# Patient Record
Sex: MALE | Race: WHITE | NOT HISPANIC OR LATINO | Employment: FULL TIME | URBAN - NONMETROPOLITAN AREA
[De-identification: names, ages, dates, MRNs, and addresses within clinical notes are randomized per-mention and may not be internally consistent; named-entity substitution may affect disease eponyms.]

---

## 2019-09-08 ENCOUNTER — HOSPITAL ENCOUNTER (OUTPATIENT)
Facility: HOSPITAL | Age: 44
Setting detail: OBSERVATION
Discharge: HOME | End: 2019-09-09
Attending: FAMILY MEDICINE | Admitting: FAMILY MEDICINE

## 2019-09-08 ENCOUNTER — APPOINTMENT (OUTPATIENT)
Dept: GENERAL RADIOLOGY | Facility: HOSPITAL | Age: 44
End: 2019-09-08

## 2019-09-08 DIAGNOSIS — I20.0 UNSTABLE ANGINA (HCC): Primary | ICD-10-CM

## 2019-09-08 PROBLEM — R07.9 CHEST PAIN: Status: ACTIVE | Noted: 2019-09-08

## 2019-09-08 LAB
A-A DO2: 18.6 MMHG (ref 0–300)
ALBUMIN SERPL-MCNC: 4.82 G/DL (ref 3.5–5.2)
ALBUMIN/GLOB SERPL: 1.7 G/DL
ALP SERPL-CCNC: 71 U/L (ref 39–117)
ALT SERPL W P-5'-P-CCNC: 61 U/L (ref 1–41)
ANION GAP SERPL CALCULATED.3IONS-SCNC: 15.1 MMOL/L (ref 5–15)
ARTERIAL PATENCY WRIST A: ABNORMAL
AST SERPL-CCNC: 27 U/L (ref 1–40)
ATMOSPHERIC PRESS: 729 MMHG
BASE EXCESS BLDA CALC-SCNC: -1.6 MMOL/L
BASOPHILS # BLD AUTO: 0.02 10*3/MM3 (ref 0–0.2)
BASOPHILS NFR BLD AUTO: 0.2 % (ref 0–1.5)
BDY SITE: ABNORMAL
BILIRUB SERPL-MCNC: 0.2 MG/DL (ref 0.2–1.2)
BODY TEMPERATURE: 98.6 C
BUN BLD-MCNC: 19 MG/DL (ref 6–20)
BUN/CREAT SERPL: 15.7 (ref 7–25)
CALCIUM SPEC-SCNC: 9.7 MG/DL (ref 8.6–10.5)
CHLORIDE SERPL-SCNC: 101 MMOL/L (ref 98–107)
CK SERPL-CCNC: 58 U/L (ref 20–200)
CO2 SERPL-SCNC: 25.9 MMOL/L (ref 22–29)
COHGB MFR BLD: 6.3 % (ref 0–5)
CREAT BLD-MCNC: 1.21 MG/DL (ref 0.76–1.27)
CRP SERPL-MCNC: 0.25 MG/DL (ref 0–0.5)
D DIMER PPP FEU-MCNC: 0.29 MCGFEU/ML (ref 0–0.5)
DEPRECATED RDW RBC AUTO: 43.5 FL (ref 37–54)
EOSINOPHIL # BLD AUTO: 0.09 10*3/MM3 (ref 0–0.4)
EOSINOPHIL NFR BLD AUTO: 1 % (ref 0.3–6.2)
ERYTHROCYTE [DISTWIDTH] IN BLOOD BY AUTOMATED COUNT: 13 % (ref 12.3–15.4)
GFR SERPL CREATININE-BSD FRML MDRD: 65 ML/MIN/1.73
GLOBULIN UR ELPH-MCNC: 2.9 GM/DL
GLUCOSE BLD-MCNC: 103 MG/DL (ref 65–99)
HAV IGM SERPL QL IA: NORMAL
HBA1C MFR BLD: 5.8 % (ref 4.8–5.6)
HBV CORE IGM SERPL QL IA: NORMAL
HBV SURFACE AG SERPL QL IA: NORMAL
HCO3 BLDA-SCNC: 21.6 MMOL/L (ref 22–26)
HCT VFR BLD AUTO: 49.7 % (ref 37.5–51)
HCT VFR BLD CALC: 50 % (ref 42–52)
HCV AB SER DONR QL: NORMAL
HGB BLD-MCNC: 16.8 G/DL (ref 13–17.7)
HGB BLDA-MCNC: 16.9 G/DL (ref 12–16)
HOLD SPECIMEN: NORMAL
HOLD SPECIMEN: NORMAL
HOROWITZ INDEX BLD+IHG-RTO: 21 %
IMM GRANULOCYTES # BLD AUTO: 0.02 10*3/MM3 (ref 0–0.05)
IMM GRANULOCYTES NFR BLD AUTO: 0.2 % (ref 0–0.5)
LYMPHOCYTES # BLD AUTO: 2.63 10*3/MM3 (ref 0.7–3.1)
LYMPHOCYTES NFR BLD AUTO: 28.1 % (ref 19.6–45.3)
MAGNESIUM SERPL-MCNC: 2 MG/DL (ref 1.6–2.6)
MCH RBC QN AUTO: 31.5 PG (ref 26.6–33)
MCHC RBC AUTO-ENTMCNC: 33.8 G/DL (ref 31.5–35.7)
MCV RBC AUTO: 93.2 FL (ref 79–97)
METHGB BLD QL: 0.2 % (ref 0–3)
MODALITY: ABNORMAL
MONOCYTES # BLD AUTO: 0.86 10*3/MM3 (ref 0.1–0.9)
MONOCYTES NFR BLD AUTO: 9.2 % (ref 5–12)
MYOGLOBIN SERPL-MCNC: <21 NG/ML (ref 28–72)
NEUTROPHILS # BLD AUTO: 5.73 10*3/MM3 (ref 1.7–7)
NEUTROPHILS NFR BLD AUTO: 61.3 % (ref 42.7–76)
NT-PROBNP SERPL-MCNC: <5 PG/ML (ref 5–450)
OXYHGB MFR BLDV: 90.8 % (ref 85–100)
PCO2 BLDA: 32.7 MM HG (ref 35–45)
PH BLDA: 7.44 PH UNITS (ref 7.35–7.45)
PLATELET # BLD AUTO: 250 10*3/MM3 (ref 140–450)
PMV BLD AUTO: 9.5 FL (ref 6–12)
PO2 BLDA: 85.5 MM HG (ref 80–100)
POTASSIUM BLD-SCNC: 4.3 MMOL/L (ref 3.5–5.2)
PROT SERPL-MCNC: 7.7 G/DL (ref 6–8.5)
RBC # BLD AUTO: 5.33 10*6/MM3 (ref 4.14–5.8)
SAO2 % BLDCOA: 97.1 % (ref 90–100)
SODIUM BLD-SCNC: 142 MMOL/L (ref 136–145)
TROPONIN T SERPL-MCNC: <0.01 NG/ML (ref 0–0.03)
TROPONIN T SERPL-MCNC: <0.01 NG/ML (ref 0–0.03)
TSH SERPL DL<=0.05 MIU/L-ACNC: 2.15 UIU/ML (ref 0.27–4.2)
WBC NRBC COR # BLD: 9.35 10*3/MM3 (ref 3.4–10.8)
WHOLE BLOOD HOLD SPECIMEN: NORMAL
WHOLE BLOOD HOLD SPECIMEN: NORMAL

## 2019-09-08 PROCEDURE — 94799 UNLISTED PULMONARY SVC/PX: CPT

## 2019-09-08 PROCEDURE — 82375 ASSAY CARBOXYHB QUANT: CPT | Performed by: PHYSICIAN ASSISTANT

## 2019-09-08 PROCEDURE — 86140 C-REACTIVE PROTEIN: CPT | Performed by: PHYSICIAN ASSISTANT

## 2019-09-08 PROCEDURE — 71045 X-RAY EXAM CHEST 1 VIEW: CPT

## 2019-09-08 PROCEDURE — 83874 ASSAY OF MYOGLOBIN: CPT | Performed by: PHYSICIAN ASSISTANT

## 2019-09-08 PROCEDURE — 99285 EMERGENCY DEPT VISIT HI MDM: CPT

## 2019-09-08 PROCEDURE — 83050 HGB METHEMOGLOBIN QUAN: CPT | Performed by: PHYSICIAN ASSISTANT

## 2019-09-08 PROCEDURE — 36600 WITHDRAWAL OF ARTERIAL BLOOD: CPT | Performed by: PHYSICIAN ASSISTANT

## 2019-09-08 PROCEDURE — 82550 ASSAY OF CK (CPK): CPT | Performed by: PHYSICIAN ASSISTANT

## 2019-09-08 PROCEDURE — G0378 HOSPITAL OBSERVATION PER HR: HCPCS

## 2019-09-08 PROCEDURE — 84484 ASSAY OF TROPONIN QUANT: CPT | Performed by: PHYSICIAN ASSISTANT

## 2019-09-08 PROCEDURE — 83880 ASSAY OF NATRIURETIC PEPTIDE: CPT | Performed by: PHYSICIAN ASSISTANT

## 2019-09-08 PROCEDURE — 80074 ACUTE HEPATITIS PANEL: CPT | Performed by: HOSPITALIST

## 2019-09-08 PROCEDURE — 82805 BLOOD GASES W/O2 SATURATION: CPT | Performed by: PHYSICIAN ASSISTANT

## 2019-09-08 PROCEDURE — 80053 COMPREHEN METABOLIC PANEL: CPT | Performed by: PHYSICIAN ASSISTANT

## 2019-09-08 PROCEDURE — 93010 ELECTROCARDIOGRAM REPORT: CPT | Performed by: INTERNAL MEDICINE

## 2019-09-08 PROCEDURE — 25010000002 HEPARIN (PORCINE) PER 1000 UNITS: Performed by: HOSPITALIST

## 2019-09-08 PROCEDURE — 93005 ELECTROCARDIOGRAM TRACING: CPT | Performed by: PHYSICIAN ASSISTANT

## 2019-09-08 PROCEDURE — 84443 ASSAY THYROID STIM HORMONE: CPT | Performed by: PHYSICIAN ASSISTANT

## 2019-09-08 PROCEDURE — 85379 FIBRIN DEGRADATION QUANT: CPT | Performed by: PHYSICIAN ASSISTANT

## 2019-09-08 PROCEDURE — 85025 COMPLETE CBC W/AUTO DIFF WBC: CPT | Performed by: PHYSICIAN ASSISTANT

## 2019-09-08 PROCEDURE — 93005 ELECTROCARDIOGRAM TRACING: CPT | Performed by: HOSPITALIST

## 2019-09-08 PROCEDURE — 93005 ELECTROCARDIOGRAM TRACING: CPT | Performed by: FAMILY MEDICINE

## 2019-09-08 PROCEDURE — 83036 HEMOGLOBIN GLYCOSYLATED A1C: CPT | Performed by: HOSPITALIST

## 2019-09-08 PROCEDURE — 96372 THER/PROPH/DIAG INJ SC/IM: CPT

## 2019-09-08 PROCEDURE — 99220 PR INITIAL OBSERVATION CARE/DAY 70 MINUTES: CPT | Performed by: HOSPITALIST

## 2019-09-08 PROCEDURE — 83735 ASSAY OF MAGNESIUM: CPT | Performed by: PHYSICIAN ASSISTANT

## 2019-09-08 RX ORDER — SODIUM CHLORIDE 9 MG/ML
75 INJECTION, SOLUTION INTRAVENOUS CONTINUOUS
Status: DISCONTINUED | OUTPATIENT
Start: 2019-09-08 | End: 2019-09-09 | Stop reason: HOSPADM

## 2019-09-08 RX ORDER — HEPARIN SODIUM 5000 [USP'U]/ML
5000 INJECTION, SOLUTION INTRAVENOUS; SUBCUTANEOUS EVERY 12 HOURS SCHEDULED
Status: DISCONTINUED | OUTPATIENT
Start: 2019-09-08 | End: 2019-09-09 | Stop reason: HOSPADM

## 2019-09-08 RX ORDER — ASPIRIN 81 MG/1
324 TABLET, CHEWABLE ORAL ONCE
Status: COMPLETED | OUTPATIENT
Start: 2019-09-08 | End: 2019-09-08

## 2019-09-08 RX ORDER — NITROGLYCERIN 0.4 MG/1
0.4 TABLET SUBLINGUAL
Status: DISCONTINUED | OUTPATIENT
Start: 2019-09-08 | End: 2019-09-09 | Stop reason: HOSPADM

## 2019-09-08 RX ORDER — ASPIRIN 81 MG/1
81 TABLET ORAL DAILY
Status: DISCONTINUED | OUTPATIENT
Start: 2019-09-09 | End: 2019-09-09 | Stop reason: HOSPADM

## 2019-09-08 RX ORDER — SODIUM CHLORIDE 0.9 % (FLUSH) 0.9 %
10 SYRINGE (ML) INJECTION EVERY 12 HOURS SCHEDULED
Status: DISCONTINUED | OUTPATIENT
Start: 2019-09-08 | End: 2019-09-09 | Stop reason: HOSPADM

## 2019-09-08 RX ORDER — SODIUM CHLORIDE 0.9 % (FLUSH) 0.9 %
10 SYRINGE (ML) INJECTION AS NEEDED
Status: DISCONTINUED | OUTPATIENT
Start: 2019-09-08 | End: 2019-09-09 | Stop reason: HOSPADM

## 2019-09-08 RX ORDER — NICOTINE 21 MG/24HR
1 PATCH, TRANSDERMAL 24 HOURS TRANSDERMAL
Status: DISCONTINUED | OUTPATIENT
Start: 2019-09-08 | End: 2019-09-09 | Stop reason: HOSPADM

## 2019-09-08 RX ADMIN — NITROGLYCERIN 0.4 MG: 0.4 TABLET, ORALLY DISINTEGRATING SUBLINGUAL at 16:59

## 2019-09-08 RX ADMIN — ASPIRIN 324 MG: 81 TABLET, CHEWABLE ORAL at 16:56

## 2019-09-08 RX ADMIN — HEPARIN SODIUM 5000 UNITS: 5000 INJECTION INTRAVENOUS; SUBCUTANEOUS at 23:27

## 2019-09-08 RX ADMIN — SODIUM CHLORIDE 1000 ML: 9 INJECTION, SOLUTION INTRAVENOUS at 16:57

## 2019-09-08 RX ADMIN — NITROGLYCERIN 0.4 MG: 0.4 TABLET, ORALLY DISINTEGRATING SUBLINGUAL at 17:19

## 2019-09-08 RX ADMIN — SODIUM CHLORIDE 75 ML/HR: 9 INJECTION, SOLUTION INTRAVENOUS at 23:27

## 2019-09-08 RX ADMIN — NITROGLYCERIN 1 INCH: 20 OINTMENT TOPICAL at 23:28

## 2019-09-08 NOTE — ED PROVIDER NOTES
Subjective     History provided by:  Patient  Chest Pain   Pain location:  L chest  Pain quality: aching, burning and pressure    Pain radiates to:  Does not radiate  Pain severity:  Moderate  Onset quality:  Sudden  Duration:  2 hours  Timing:  Constant  Progression:  Waxing and waning  Chronicity:  New  Context: at rest    Relieved by:  Nothing  Ineffective treatments:  None tried  Associated symptoms: no abdominal pain and no fever    Risk factors: smoking        Review of Systems   Constitutional: Negative.  Negative for fever.   HENT: Negative.    Respiratory: Negative.    Cardiovascular: Positive for chest pain.   Gastrointestinal: Negative.  Negative for abdominal pain.   Endocrine: Negative.    Genitourinary: Negative.  Negative for dysuria.   Skin: Negative.    Neurological: Negative.    Psychiatric/Behavioral: Negative.    All other systems reviewed and are negative.      No past medical history on file.    No Known Allergies    No past surgical history on file.    No family history on file.    Social History     Socioeconomic History   • Marital status: Legally      Spouse name: Not on file   • Number of children: Not on file   • Years of education: Not on file   • Highest education level: Not on file   Tobacco Use   • Smoking status: Current Every Day Smoker     Packs/day: 1.50     Types: Cigarettes   Substance and Sexual Activity   • Alcohol use: Yes           Objective   Physical Exam   Constitutional: He is oriented to person, place, and time. He appears well-developed and well-nourished. No distress.   HENT:   Head: Normocephalic and atraumatic.   Right Ear: External ear normal.   Left Ear: External ear normal.   Nose: Nose normal.   Eyes: Conjunctivae are normal.   Neck: Normal range of motion. Neck supple. No JVD present. No tracheal deviation present.   Cardiovascular: Normal rate, regular rhythm and normal heart sounds.   No murmur heard.  Pulmonary/Chest: Effort normal and breath sounds  normal. No respiratory distress. He has no wheezes.   Abdominal: Soft. Bowel sounds are normal. There is no tenderness.   Musculoskeletal: Normal range of motion. He exhibits no edema or deformity.   Neurological: He is alert and oriented to person, place, and time. No cranial nerve deficit.   Skin: Skin is warm and dry. No rash noted. He is not diaphoretic. No erythema. No pallor.   Psychiatric: He has a normal mood and affect. His behavior is normal. Thought content normal.   Nursing note and vitals reviewed.      Procedures           ED Course  ED Course as of Sep 09 0048   Sun Sep 08, 2019   1652 EKG interpreted by Dr. Hart: Normal sinus rhythm with ventricular rate 89 bpm no indication of STEMI.  [RB]   1756 Patient was evaluated and noted to have left-sided chest pain described as pressure that is been ongoing for the past 2 hours.  Patient does have significant family history as well as is a smoker he does not follow-up with primary care provider.  EKG is unremarkable.  Patient's heart score is a 4.  [BB]   1800 CXR rad interpreted:  No acute cardiopulmonary findings.    [RB]   2025 Discussed w/ Dr. Julio.  Pt accepted for cardiac observation   [RB]      ED Course User Index  [BB] Jasiel Hart MD  [RB] Ernesto Rodriguez PA                HEART Score (for prediction of 6-week risk of major adverse cardiac event) reviewed and/or performed as part of the patient evaluation and treatment planning process.  The result associated with this review/performance is: 4       MDM  Number of Diagnoses or Management Options  Unstable angina (CMS/HCC): new and requires workup     Amount and/or Complexity of Data Reviewed  Clinical lab tests: ordered and reviewed  Tests in the radiology section of CPT®: ordered and reviewed  Discuss the patient with other providers: yes    Risk of Complications, Morbidity, and/or Mortality  Presenting problems: moderate  Diagnostic procedures: moderate  Management options:  moderate    Patient Progress  Patient progress: stable      Final diagnoses:   Unstable angina (CMS/[facility])              Ernesto Rodriguez PA  09/09/19 0048

## 2019-09-09 ENCOUNTER — APPOINTMENT (OUTPATIENT)
Dept: NUCLEAR MEDICINE | Facility: HOSPITAL | Age: 44
End: 2019-09-09

## 2019-09-09 ENCOUNTER — APPOINTMENT (OUTPATIENT)
Dept: CARDIOLOGY | Facility: HOSPITAL | Age: 44
End: 2019-09-09

## 2019-09-09 VITALS
SYSTOLIC BLOOD PRESSURE: 121 MMHG | RESPIRATION RATE: 18 BRPM | BODY MASS INDEX: 29.35 KG/M2 | DIASTOLIC BLOOD PRESSURE: 68 MMHG | HEART RATE: 79 BPM | HEIGHT: 70 IN | TEMPERATURE: 97.6 F | WEIGHT: 205.03 LBS | OXYGEN SATURATION: 96 %

## 2019-09-09 LAB
ALBUMIN SERPL-MCNC: 4.35 G/DL (ref 3.5–5.2)
ALBUMIN/GLOB SERPL: 1.8 G/DL
ALP SERPL-CCNC: 61 U/L (ref 39–117)
ALT SERPL W P-5'-P-CCNC: 55 U/L (ref 1–41)
ANION GAP SERPL CALCULATED.3IONS-SCNC: 12.1 MMOL/L (ref 5–15)
AST SERPL-CCNC: 26 U/L (ref 1–40)
BASOPHILS # BLD AUTO: 0.02 10*3/MM3 (ref 0–0.2)
BASOPHILS NFR BLD AUTO: 0.2 % (ref 0–1.5)
BH CV ECHO MEAS - % IVS THICK: 4.4 %
BH CV ECHO MEAS - % LVPW THICK: 29 %
BH CV ECHO MEAS - ACS: 2 CM
BH CV ECHO MEAS - AO MAX PG: 8.9 MMHG
BH CV ECHO MEAS - AO MEAN PG: 4 MMHG
BH CV ECHO MEAS - AO ROOT AREA (BSA CORRECTED): 1.6
BH CV ECHO MEAS - AO ROOT AREA: 8.6 CM^2
BH CV ECHO MEAS - AO ROOT DIAM: 3.3 CM
BH CV ECHO MEAS - AO V2 MAX: 149 CM/SEC
BH CV ECHO MEAS - AO V2 MEAN: 92.5 CM/SEC
BH CV ECHO MEAS - AO V2 VTI: 33.8 CM
BH CV ECHO MEAS - BSA(HAYCOCK): 2.2 M^2
BH CV ECHO MEAS - BSA: 2.1 M^2
BH CV ECHO MEAS - BZI_BMI: 29.4 KILOGRAMS/M^2
BH CV ECHO MEAS - BZI_METRIC_HEIGHT: 177.8 CM
BH CV ECHO MEAS - BZI_METRIC_WEIGHT: 93 KG
BH CV ECHO MEAS - EDV(CUBED): 134.6 ML
BH CV ECHO MEAS - EDV(MOD-SP4): 60 ML
BH CV ECHO MEAS - EDV(TEICH): 125.2 ML
BH CV ECHO MEAS - EF(CUBED): 57.1 %
BH CV ECHO MEAS - EF(MOD-SP4): 55 %
BH CV ECHO MEAS - EF(TEICH): 48.5 %
BH CV ECHO MEAS - ESV(CUBED): 57.7 ML
BH CV ECHO MEAS - ESV(MOD-SP4): 27 ML
BH CV ECHO MEAS - ESV(TEICH): 64.5 ML
BH CV ECHO MEAS - FS: 24.6 %
BH CV ECHO MEAS - IVS/LVPW: 1.1
BH CV ECHO MEAS - IVSD: 1.4 CM
BH CV ECHO MEAS - IVSS: 1.4 CM
BH CV ECHO MEAS - LA DIMENSION: 3.5 CM
BH CV ECHO MEAS - LA/AO: 1.1
BH CV ECHO MEAS - LV DIASTOLIC VOL/BSA (35-75): 28.4 ML/M^2
BH CV ECHO MEAS - LV MASS(C)D: 281.9 GRAMS
BH CV ECHO MEAS - LV MASS(C)DI: 133.7 GRAMS/M^2
BH CV ECHO MEAS - LV MASS(C)S: 233.7 GRAMS
BH CV ECHO MEAS - LV MASS(C)SI: 110.8 GRAMS/M^2
BH CV ECHO MEAS - LV SYSTOLIC VOL/BSA (12-30): 12.8 ML/M^2
BH CV ECHO MEAS - LVIDD: 5.1 CM
BH CV ECHO MEAS - LVIDS: 3.9 CM
BH CV ECHO MEAS - LVLD AP4: 6.9 CM
BH CV ECHO MEAS - LVLS AP4: 6.2 CM
BH CV ECHO MEAS - LVOT AREA (M): 3.5 CM^2
BH CV ECHO MEAS - LVOT AREA: 3.5 CM^2
BH CV ECHO MEAS - LVOT DIAM: 2.1 CM
BH CV ECHO MEAS - LVPWD: 1.3 CM
BH CV ECHO MEAS - LVPWS: 1.7 CM
BH CV ECHO MEAS - MV A MAX VEL: 66.1 CM/SEC
BH CV ECHO MEAS - MV E MAX VEL: 94.3 CM/SEC
BH CV ECHO MEAS - MV E/A: 1.4
BH CV ECHO MEAS - PA ACC SLOPE: 761 CM/SEC^2
BH CV ECHO MEAS - PA ACC TIME: 0.14 SEC
BH CV ECHO MEAS - PA PR(ACCEL): 15.6 MMHG
BH CV ECHO MEAS - RAP SYSTOLE: 10 MMHG
BH CV ECHO MEAS - RVSP: 21.2 MMHG
BH CV ECHO MEAS - SI(AO): 137.1 ML/M^2
BH CV ECHO MEAS - SI(CUBED): 36.4 ML/M^2
BH CV ECHO MEAS - SI(MOD-SP4): 15.6 ML/M^2
BH CV ECHO MEAS - SI(TEICH): 28.8 ML/M^2
BH CV ECHO MEAS - SV(AO): 289.1 ML
BH CV ECHO MEAS - SV(CUBED): 76.9 ML
BH CV ECHO MEAS - SV(MOD-SP4): 33 ML
BH CV ECHO MEAS - SV(TEICH): 60.7 ML
BH CV ECHO MEAS - TR MAX VEL: 167 CM/SEC
BH CV NUCLEAR PRIOR STUDY: 3
BH CV STRESS BP STAGE 1: NORMAL
BH CV STRESS BP STAGE 2: NORMAL
BH CV STRESS BP STAGE 3: NORMAL
BH CV STRESS BP STAGE 4: NORMAL
BH CV STRESS DURATION MIN STAGE 1: 3
BH CV STRESS DURATION MIN STAGE 2: 3
BH CV STRESS DURATION MIN STAGE 3: 3
BH CV STRESS DURATION MIN STAGE 4: 3
BH CV STRESS DURATION SEC STAGE 1: 0
BH CV STRESS DURATION SEC STAGE 2: 0
BH CV STRESS DURATION SEC STAGE 3: 0
BH CV STRESS DURATION SEC STAGE 4: 0
BH CV STRESS GRADE STAGE 1: 10
BH CV STRESS GRADE STAGE 2: 12
BH CV STRESS GRADE STAGE 3: 14
BH CV STRESS GRADE STAGE 4: 16
BH CV STRESS HR STAGE 1: 104
BH CV STRESS HR STAGE 2: 121
BH CV STRESS HR STAGE 3: 146
BH CV STRESS HR STAGE 4: 161
BH CV STRESS METS STAGE 1: 5
BH CV STRESS METS STAGE 2: 7.5
BH CV STRESS METS STAGE 3: 10
BH CV STRESS METS STAGE 4: 13.5
BH CV STRESS PROTOCOL 1: NORMAL
BH CV STRESS RECOVERY BP: NORMAL MMHG
BH CV STRESS RECOVERY HR: 98 BPM
BH CV STRESS SPEED STAGE 1: 1.7
BH CV STRESS SPEED STAGE 2: 2.5
BH CV STRESS SPEED STAGE 3: 3.4
BH CV STRESS SPEED STAGE 4: 4.2
BH CV STRESS STAGE 1: 1
BH CV STRESS STAGE 2: 2
BH CV STRESS STAGE 3: 3
BH CV STRESS STAGE 4: 4
BILIRUB SERPL-MCNC: 0.3 MG/DL (ref 0.2–1.2)
BUN BLD-MCNC: 20 MG/DL (ref 6–20)
BUN/CREAT SERPL: 25.3 (ref 7–25)
CALCIUM SPEC-SCNC: 8.7 MG/DL (ref 8.6–10.5)
CHLORIDE SERPL-SCNC: 106 MMOL/L (ref 98–107)
CHOLEST SERPL-MCNC: 153 MG/DL (ref 0–200)
CO2 SERPL-SCNC: 19.9 MMOL/L (ref 22–29)
CREAT BLD-MCNC: 0.79 MG/DL (ref 0.76–1.27)
DEPRECATED RDW RBC AUTO: 45.9 FL (ref 37–54)
EOSINOPHIL # BLD AUTO: 0.11 10*3/MM3 (ref 0–0.4)
EOSINOPHIL NFR BLD AUTO: 1.3 % (ref 0.3–6.2)
ERYTHROCYTE [DISTWIDTH] IN BLOOD BY AUTOMATED COUNT: 13.3 % (ref 12.3–15.4)
GFR SERPL CREATININE-BSD FRML MDRD: 107 ML/MIN/1.73
GLOBULIN UR ELPH-MCNC: 2.5 GM/DL
GLUCOSE BLD-MCNC: 97 MG/DL (ref 65–99)
HCT VFR BLD AUTO: 46.5 % (ref 37.5–51)
HDLC SERPL-MCNC: 37 MG/DL (ref 40–60)
HGB BLD-MCNC: 15.4 G/DL (ref 13–17.7)
IMM GRANULOCYTES # BLD AUTO: 0.02 10*3/MM3 (ref 0–0.05)
IMM GRANULOCYTES NFR BLD AUTO: 0.2 % (ref 0–0.5)
LDLC SERPL CALC-MCNC: 83 MG/DL (ref 0–100)
LDLC/HDLC SERPL: 2.23 {RATIO}
LV EF NUC BP: 56 %
LYMPHOCYTES # BLD AUTO: 2.55 10*3/MM3 (ref 0.7–3.1)
LYMPHOCYTES NFR BLD AUTO: 30.1 % (ref 19.6–45.3)
MAXIMAL PREDICTED HEART RATE: 177 BPM
MAXIMAL PREDICTED HEART RATE: 177 BPM
MCH RBC QN AUTO: 31.6 PG (ref 26.6–33)
MCHC RBC AUTO-ENTMCNC: 33.1 G/DL (ref 31.5–35.7)
MCV RBC AUTO: 95.3 FL (ref 79–97)
MONOCYTES # BLD AUTO: 0.99 10*3/MM3 (ref 0.1–0.9)
MONOCYTES NFR BLD AUTO: 11.7 % (ref 5–12)
NEUTROPHILS # BLD AUTO: 4.78 10*3/MM3 (ref 1.7–7)
NEUTROPHILS NFR BLD AUTO: 56.5 % (ref 42.7–76)
PERCENT MAX PREDICTED HR: 58.76 %
PLATELET # BLD AUTO: 235 10*3/MM3 (ref 140–450)
PMV BLD AUTO: 9.7 FL (ref 6–12)
POTASSIUM BLD-SCNC: 4.1 MMOL/L (ref 3.5–5.2)
PROT SERPL-MCNC: 6.8 G/DL (ref 6–8.5)
RBC # BLD AUTO: 4.88 10*6/MM3 (ref 4.14–5.8)
SODIUM BLD-SCNC: 138 MMOL/L (ref 136–145)
STRESS BASELINE BP: NORMAL MMHG
STRESS BASELINE HR: 86 BPM
STRESS PERCENT HR: 69 %
STRESS POST ESTIMATED WORKLOAD: 10.1 METS
STRESS POST EXERCISE DUR MIN: 9 MIN
STRESS POST EXERCISE DUR SEC: 28 SEC
STRESS POST PEAK BP: NORMAL MMHG
STRESS POST PEAK HR: 104 BPM
STRESS TARGET HR: 150 BPM
STRESS TARGET HR: 150 BPM
TRIGL SERPL-MCNC: 167 MG/DL (ref 0–150)
TROPONIN T SERPL-MCNC: <0.01 NG/ML (ref 0–0.03)
TROPONIN T SERPL-MCNC: <0.01 NG/ML (ref 0–0.03)
VLDLC SERPL-MCNC: 33.4 MG/DL
WBC NRBC COR # BLD: 8.47 10*3/MM3 (ref 3.4–10.8)

## 2019-09-09 PROCEDURE — 99217 PR OBSERVATION CARE DISCHARGE MANAGEMENT: CPT | Performed by: FAMILY MEDICINE

## 2019-09-09 PROCEDURE — A9500 TC99M SESTAMIBI: HCPCS | Performed by: FAMILY MEDICINE

## 2019-09-09 PROCEDURE — 80053 COMPREHEN METABOLIC PANEL: CPT | Performed by: HOSPITALIST

## 2019-09-09 PROCEDURE — 93017 CV STRESS TEST TRACING ONLY: CPT

## 2019-09-09 PROCEDURE — 0 TECHNETIUM SESTAMIBI: Performed by: FAMILY MEDICINE

## 2019-09-09 PROCEDURE — G0378 HOSPITAL OBSERVATION PER HR: HCPCS

## 2019-09-09 PROCEDURE — 78452 HT MUSCLE IMAGE SPECT MULT: CPT | Performed by: INTERNAL MEDICINE

## 2019-09-09 PROCEDURE — 85025 COMPLETE CBC W/AUTO DIFF WBC: CPT | Performed by: HOSPITALIST

## 2019-09-09 PROCEDURE — 93306 TTE W/DOPPLER COMPLETE: CPT

## 2019-09-09 PROCEDURE — 25010000002 HEPARIN (PORCINE) PER 1000 UNITS: Performed by: HOSPITALIST

## 2019-09-09 PROCEDURE — 93018 CV STRESS TEST I&R ONLY: CPT | Performed by: INTERNAL MEDICINE

## 2019-09-09 PROCEDURE — 96372 THER/PROPH/DIAG INJ SC/IM: CPT

## 2019-09-09 PROCEDURE — 80061 LIPID PANEL: CPT | Performed by: HOSPITALIST

## 2019-09-09 PROCEDURE — 93306 TTE W/DOPPLER COMPLETE: CPT | Performed by: INTERNAL MEDICINE

## 2019-09-09 PROCEDURE — 84484 ASSAY OF TROPONIN QUANT: CPT | Performed by: HOSPITALIST

## 2019-09-09 PROCEDURE — 99225 PR SBSQ OBSERVATION CARE/DAY 25 MINUTES: CPT | Performed by: FAMILY MEDICINE

## 2019-09-09 PROCEDURE — 78452 HT MUSCLE IMAGE SPECT MULT: CPT

## 2019-09-09 RX ORDER — ASPIRIN 81 MG/1
81 TABLET ORAL DAILY
Qty: 30 TABLET | Refills: 0 | Status: SHIPPED | OUTPATIENT
Start: 2019-09-10 | End: 2019-10-10

## 2019-09-09 RX ORDER — NITROGLYCERIN 0.4 MG/1
TABLET SUBLINGUAL
Qty: 25 TABLET | Refills: 12 | Status: SHIPPED | OUTPATIENT
Start: 2019-09-09

## 2019-09-09 RX ORDER — ATORVASTATIN CALCIUM 20 MG/1
20 TABLET, FILM COATED ORAL DAILY
Qty: 30 TABLET | Refills: 0 | Status: SHIPPED | OUTPATIENT
Start: 2019-09-09 | End: 2019-10-09

## 2019-09-09 RX ORDER — NICOTINE 21 MG/24HR
1 PATCH, TRANSDERMAL 24 HOURS TRANSDERMAL EVERY 24 HOURS
Qty: 14 PATCH | Refills: 0 | Status: SHIPPED | OUTPATIENT
Start: 2019-09-09 | End: 2019-09-23

## 2019-09-09 RX ADMIN — ASPIRIN 81 MG: 81 TABLET, COATED ORAL at 14:55

## 2019-09-09 RX ADMIN — TECHNETIUM TC 99M SESTAMIBI 1 DOSE: 1 INJECTION INTRAVENOUS at 09:08

## 2019-09-09 RX ADMIN — NICOTINE 1 PATCH: 14 PATCH TRANSDERMAL at 14:54

## 2019-09-09 RX ADMIN — TECHNETIUM TC 99M SESTAMIBI 1 DOSE: 1 INJECTION INTRAVENOUS at 12:00

## 2019-09-09 RX ADMIN — HEPARIN SODIUM 5000 UNITS: 5000 INJECTION INTRAVENOUS; SUBCUTANEOUS at 14:56

## 2019-09-09 NOTE — PROGRESS NOTES
[facility] HOSPITALIST PROGRESS NOTE     Patient Identification:  Name:  Francisco Santos  Age:  43 y.o.  Sex:  male  :  1975  MRN:  [id number]  Visit Number:  [id number]  ROOM: [facility]     Primary Care Provider:  Provider, No Known    Length of stay in inpatient status:  0    Subjective     Chief Compliant:    Chief Complaint   Patient presents with   • Chest Pain       History of Presenting Illness: 43-year-old gentleman who Was brought in with chest pain last evening.  Patient states that pain was type of pressure that did radiate to the neck and had shortness of breath with diaphoresis.  He states the pain did last several minutes before subsiding.  Patient states that the sublingual nitroglycerin was beneficial to him.  Patient is a smoker.  Overnight patient's cardiac enzymes have been negative and at this time he is chest pain-free    Objective     Current Hospital Meds:  aspirin 81 mg Oral Daily   heparin (porcine) 5,000 Units Subcutaneous Q12H   nicotine 1 patch Transdermal Q24H   nitroglycerin 1 inch Topical Q6H   sodium chloride 10 mL Intravenous Q12H     sodium chloride 75 mL/hr Last Rate: 75 mL/hr (19 0842)     ----------------------------------------------------------------------------------------------------------------------  Vital Signs:  Temp:  [97.5 °F (36.4 °C)-97.8 °F (36.6 °C)] 97.5 °F (36.4 °C)  Heart Rate:  [70-99] 79  Resp:  [16-18] 18  BP: (100-153)/(59-99) 100/59  SpO2:  [96 %-99 %] 97 %  on   ;   Device (Oxygen Therapy): room air  Body mass index is 29.42 kg/m².    Wt Readings from Last 3 Encounters:   19 93 kg (205 lb 0.4 oz)     Intake & Output (last 3 days)       09/06 0701 - 09/07 07 -  0700 09/08 0701 - 09/09 0700 09/09 0701 - 09/10 0700    P.O.   400     IV Piggyback   1000     Total Intake(mL/kg)   1400 (15.1)     Net   +1400             Urine Unmeasured Occurrence   2 x         NPO Diet NPO Except: Sips With  Meds  ----------------------------------------------------------------------------------------------------------------------  Physical exam:  Constitutional: No acute distress  HEENT: Normocephalic atraumatic  Neck:   Supple  Cardiovascular: Regular rate and rhythm  Pulmonary/Chest: Clear to auscultation  Abdominal: Positive bowel sounds soft.   Musculoskeletal: No arthropathy  Neurological: No focal deficits  Skin: No rashes  Peripheral vascular:  Genitourinary:  ----------------------------------------------------------------------------------------------------------------------    Last echocardiogram:     ----------------------------------------------------------------------------------------------------------------------  Results from last 7 days   Lab Units 09/09/19  0100 09/08/19  1701   CRP mg/dL  --  0.25   WBC 10*3/mm3 8.47 9.35   HEMOGLOBIN g/dL 15.4 16.8   HEMATOCRIT % 46.5 49.7   MCV fL 95.3 93.2   MCHC g/dL 33.1 33.8   PLATELETS 10*3/mm3 235 250     Results from last 7 days   Lab Units 09/08/19  1704   PH, ARTERIAL pH units 7.437   PO2 ART mm Hg 85.5   PCO2, ARTERIAL mm Hg 32.7*   HCO3 ART mmol/L 21.6*     Results from last 7 days   Lab Units 09/09/19  0100 09/08/19  1701   SODIUM mmol/L 138 142   POTASSIUM mmol/L 4.1 4.3   MAGNESIUM mg/dL  --  2.0   CHLORIDE mmol/L 106 101   CO2 mmol/L 19.9* 25.9   BUN mg/dL 20 19   CREATININE mg/dL 0.79 1.21   EGFR IF NONAFRICN AM mL/min/1.73 107 65   CALCIUM mg/dL 8.7 9.7   GLUCOSE mg/dL 97 103*   ALBUMIN g/dL 4.35 4.82   BILIRUBIN mg/dL 0.3 0.2   ALK PHOS U/L 61 71   AST (SGOT) U/L 26 27   ALT (SGPT) U/L 55* 61*   Estimated Creatinine Clearance: 138.1 mL/min (by C-G formula based on SCr of 0.79 mg/dL).  No results found for: AMMONIA  Results from last 7 days   Lab Units 09/09/19  0733 09/09/19  0100 09/08/19  1849 09/08/19  1701   CK TOTAL U/L  --   --   --  58   TROPONIN T ng/mL <0.010 <0.010 <0.010 <0.010   MYOGLOBIN ng/mL  --   --   --  <21.0*     Results from  last 7 days   Lab Units 09/08/19  1701   PROBNP pg/mL <5.0*     Results from last 7 days   Lab Units 09/09/19  0100   CHOLESTEROL mg/dL 153   TRIGLYCERIDES mg/dL 167*   HDL CHOL mg/dL 37*   LDL CHOL mg/dL 83     Hemoglobin A1C   Date/Time Value Ref Range Status   09/08/2019 1701 5.80 (H) 4.80 - 5.60 % Final     Lab Results   Component Value Date    TSH 2.150 09/08/2019     No results found for: PREGTESTUR, PREGSERUM, HCG, HCGQUANT  Pain Management Panel     There is no flowsheet data to display.        Brief Urine Lab Results     None                        Results from last 7 days   Lab Units 09/08/19  1701   CRP mg/dL 0.25       I have personally looked at the labs and they are summarized above.  ----------------------------------------------------------------------------------------------------------------------  Detailed radiology reports for the last 24 hours:    Imaging Results (last 24 hours)     Procedure Component Value Units Date/Time    XR Chest 1 View [[id number]] Collected:  09/08/19 1723     Updated:  09/08/19 1725    Narrative:       CR Chest 1 Vw    INDICATION:   Chest pain and pressure for the past week     COMPARISON:    None available.    FINDINGS:  Single portable AP view(s) of the chest.  The heart and mediastinal contours are normal. The lungs are clear. No pneumothorax or pleural effusion.      Impression:       No acute cardiopulmonary findings.    Signer Name: Pedro Robb MD   Signed: 9/8/2019 5:23 PM   Workstation Name: [facility]    [facility]        Final impressions for the last 30 days of radiology reports:    Xr Chest 1 View    Result Date: 9/8/2019  No acute cardiopulmonary findings. Signer Name: Pedro Rbob MD  Signed: 9/8/2019 5:23 PM  Workstation Name: [facility]  [facility] [place]    I have personally looked at the radiology images and read the final radiology report.    Assessment & Plan    Chest pain--cannot rule out the  possibility of unstable angina.  Cardiac enzymes have been negative to this point.  Stress test is pending echocardiogram pending.  We will follow-up with patient after test are obtained and then will reevaluate and determine appropriate next steps.    VTE Prophylaxis:   Mechanical Order History:     None      Pharmalogical Order History:     Ordered     Dose Route Frequency Stop    09/08/19 2118  heparin (porcine) 5000 UNIT/ML injection 5,000 Units      5,000 Units SC Every 12 Hours Scheduled --              Manfred Og MD  [facility]  09/09/19  9:37 AM

## 2019-09-09 NOTE — ED NOTES
Called 3S to give report, unit secretary states to call back in 10 minutes as all the nurses are currently in rooms at this time.      Laxmi Sharma, STARR  09/08/19 5571

## 2019-09-09 NOTE — H&P
"Hospitalist History and Physical        Patient Identification  Name: Francisco Snatos  Age/Sex: 43 y.o. male  :  1975        MRN: [id number]  Visit Number: [id number]  PCP: Provider, No Known        Chief complaint     History of Present Illness:  Patient is a 43 y.o. male who presents with complaints of chest pain. His first episode was about a week ago shortly after he had gone on at break at work. He had been exerting himself prior to this (works at [employer] which he describes is pretty demanding physically, often moving heavy bags of flour). He described the pain as pressure, radiating to his neck, associated with dyspnea and diaphoresis. This pain lasted several minutes before subsiding. He had been pain free until earlier today about 2 hours prior to presentation. On this occasion his pain occurred at rest. It did not subside until he reached the ED and received sublingual nitroglycerin. Patient denies history of coronary artery disease but reports family history of premature CAD (uncle who  of MI in early 50s). He does smoke 1-1.5 pack cigarettes daily. He states his chest pain had totally subsided while in the ED but now feels like it might be \"trying to come back\" on the observation unit.     Review of Systems  Review of Systems   Constitutional: Positive for diaphoresis. Negative for activity change, appetite change, chills, fatigue and fever.   HENT: Negative for congestion, postnasal drip, rhinorrhea, sinus pressure, sinus pain and sore throat.    Eyes: Negative for photophobia, pain, discharge, redness, itching and visual disturbance.   Respiratory: Positive for shortness of breath. Negative for cough and wheezing.    Cardiovascular: Positive for chest pain. Negative for palpitations and leg swelling.   Gastrointestinal: Positive for nausea. Negative for abdominal distention, abdominal pain, blood in stool, constipation, diarrhea and vomiting.   Endocrine: Negative for cold intolerance, heat " intolerance, polydipsia, polyphagia and polyuria.   Genitourinary: Negative for difficulty urinating, dysuria, flank pain and hematuria.   Musculoskeletal: Negative for arthralgias, back pain, joint swelling, myalgias, neck pain and neck stiffness.   Skin: Negative for color change, pallor, rash and wound.   Allergic/Immunologic: Negative for environmental allergies, food allergies and immunocompromised state.   Neurological: Negative for dizziness, tremors, seizures, syncope, weakness, light-headedness, numbness and headaches.   Hematological: Negative for adenopathy. Does not bruise/bleed easily.   Psychiatric/Behavioral: Negative for agitation, behavioral problems and confusion.       History  No past medical history on file.  No past surgical history on file.     Family History:  - premature coronary artery disease (uncle  of MI in early 50s)  Social History     Tobacco Use   • Smoking status: Current Every Day Smoker     Packs/day: 1.50     Types: Cigarettes   Substance Use Topics   • Alcohol use: Yes (3-4 beers per sitting, 4 times per week)   • Drug use: Not on file     No medications prior to admission.     Allergies:  Patient has no known allergies.    Objective     Vital Signs  Temp:  [97.5 °F (36.4 °C)-97.6 °F (36.4 °C)] 97.5 °F (36.4 °C)  Heart Rate:  [70-99] 77  Resp:  [16-18] 18  BP: (125-153)/(63-99) 150/81  Body mass index is 29.42 kg/m².    Physical Exam:  Physical Exam   Constitutional: He is oriented to person, place, and time. He appears well-developed and well-nourished. No distress.   HENT:   Head: Normocephalic and atraumatic.   Mouth/Throat: Oropharynx is clear and moist.   Eyes: Conjunctivae and EOM are normal. Pupils are equal, round, and reactive to light.   Neck: Normal range of motion. Neck supple. No JVD present.   Cardiovascular: Normal rate, regular rhythm, normal heart sounds and intact distal pulses.   No murmur heard.  Pulmonary/Chest: Effort normal and breath sounds normal. No  respiratory distress. He has no wheezes. He has no rales. He exhibits no tenderness.   Abdominal: Soft. Bowel sounds are normal. He exhibits no distension. There is no tenderness.   Musculoskeletal: Normal range of motion. He exhibits no edema, tenderness or deformity.   Lymphadenopathy:     He has no cervical adenopathy.   Neurological: He is alert and oriented to person, place, and time.   No gross focal deficits appreciated   Skin: Skin is warm and dry. Capillary refill takes less than 2 seconds. No rash noted. He is not diaphoretic. No erythema. No pallor.   Psychiatric: He has a normal mood and affect. His behavior is normal. Judgment and thought content normal.         Results Review:       Lab Results:  Results from last 7 days   Lab Units 09/08/19  1701   WBC 10*3/mm3 9.35   HEMOGLOBIN g/dL 16.8   PLATELETS 10*3/mm3 250     Results from last 7 days   Lab Units 09/08/19  1701   CRP mg/dL 0.25     Results from last 7 days   Lab Units 09/08/19  1701   SODIUM mmol/L 142   POTASSIUM mmol/L 4.3   CHLORIDE mmol/L 101   CO2 mmol/L 25.9   BUN mg/dL 19   CREATININE mg/dL 1.21   CALCIUM mg/dL 9.7   GLUCOSE mg/dL 103*     Results from last 7 days   Lab Units 09/08/19  1701   MAGNESIUM mg/dL 2.0     Hemoglobin A1C   Date Value Ref Range Status   09/08/2019 5.80 (H) 4.80 - 5.60 % Final     Results from last 7 days   Lab Units 09/08/19  1701   BILIRUBIN mg/dL 0.2   ALK PHOS U/L 71   AST (SGOT) U/L 27   ALT (SGPT) U/L 61*     Results from last 7 days   Lab Units 09/08/19  1849 09/08/19  1701   CK TOTAL U/L  --  58   TROPONIN T ng/mL <0.010 <0.010   MYOGLOBIN ng/mL  --  <21.0*             Results from last 7 days   Lab Units 09/08/19  1704   PH, ARTERIAL pH units 7.437   PO2 ART mm Hg 85.5   PCO2, ARTERIAL mm Hg 32.7*   HCO3 ART mmol/L 21.6*       I have reviewed the patient's laboratory results.    Imaging:  Imaging Results (last 72 hours)     Procedure Component Value Units Date/Time    XR Chest 1 View [[id number]]  Collected:  09/08/19 1723     Updated:  09/08/19 1725    Narrative:       CR Chest 1 Vw    INDICATION:   Chest pain and pressure for the past week     COMPARISON:    None available.    FINDINGS:  Single portable AP view(s) of the chest.  The heart and mediastinal contours are normal. The lungs are clear. No pneumothorax or pleural effusion.      Impression:       No acute cardiopulmonary findings.    Signer Name: Pedro Robb MD   Signed: 9/8/2019 5:23 PM   Workstation [facility]: [facility]    [facility] [place]          I have personally reviewed the patient's radiologic imaging.        EKG: NSR, HR 82, QTc 450, no overt ST changes appreciated    I have personally reviewed the patient's EKG.        Assessment/Plan     - Chest pain with typical features, in setting of history of tobacco abuse and family history of premature coronary artery disease: admitted to observation unit. Continue to trend troponin. Received full dose ASA in ED; will continue low dose ASA in the morning. Pain resolved with SL nitroglycerin, now patient feels like pain may be coming back, so nitropaste ordered. If continues to rule out for MI, will evaluate further with ECHO and nuclear stress test in the morning. Check lipid panel in AM.  - Tobacco abuse: nicotine patch offered which patient accepted. Will need cessation counseling prior to discharge.  - Mild transaminitis: possibly related to alcohol use. Screen for viral hepatitis. Continue to monitor LFTs.  - Pre-diabetes: based on A1c 5.8%. Recommend lifestyle modification, will need to establish PCP to follow this in outpatient setting.  - Alcohol use: place on [facility] protocol surveillance.     DVT Prophylaxis: SQ heparin    Estimated Length of Stay < 2 midnights    I discussed the patient's findings, assessment and plan with the patient and her RN Rubi who was present during my interview and exam on the observation unit.     Justice Julio MD  09/08/19  10:37 PM

## 2019-09-09 NOTE — ED NOTES
Patient taken to OBS in wheelchair by tech. PWD. NADN. VSS. Respirations even and unlabored on room air. IV saline locked and patent.      Laxmi Sharma RN  09/08/19 [id number]

## 2019-09-09 NOTE — DISCHARGE SUMMARY
[facility] DISCHARGE SUMMARY    Patient Identification:  Name:  Francisco Santos  Age:  43 y.o.  Sex:  male  :  1975  MRN:  [id number]  Visit Number:  [id number]    Date of Admission: 2019  Date of Discharge:  2019     PCP: Provider, No Known    DISCHARGE DIAGNOSIS  Chest pain, ruled out MI    CONSULTS       PROCEDURES PERFORMED  Stress test    HOSPITAL COURSE  Patient is a 43 y.o. male presented to [facility] who presented to the emergency department with complaints of chest pain.  Patient states he had an episode of proximal a week prior white count on a work break.  Says he been exerting himself and described the pain as a pressure that radiated to his neck with some dyspnea and diaphoresis.  Patient states he did not have any further episodes until yesterday and the day of admission and had pain 2 hours prior to presentation.  He did not subside to reach the ED and was given sublingual nitroglycerin.  She does have a positive family history of premature coronary disease with an uncle who  of MI in his early 50s.  Patient does smoke approximate 1 to 1/2 pack cigarettes per day.  Denied any other difficulties was brought in for rule out MI protocol and further evaluation.  Patient's cardiac enzymes have been negative during the admission.  Patient did have stress test today and was not found to have any EKG EKG criteria consistent with ischemia.  However, on myocardial perfusion imaging there is a small sized mild degree of ischemia load and located in the inferior wall.  I discussed findings with Dr. Landry and he felt that patient would benefit from medical management at this point time and did not did not require cardiac catheterization.  Patient has been chest pain-free throughout the day today and he is agreeable at this time.  I did discuss with patient he is that if he has recurrence of chest pain he should come back to the emergency department  immediately.  We will also arrange follow-up appointment with Dr. Landry in 3 weeks.  We will discharged home with statin therapy, aspirin therapy and will give nitroglycerin.  Patient's blood pressure slightly on the low side and consequently will help withhold beta-blocker at this time.      VITAL SIGNS:  Temp:  [97.5 °F (36.4 °C)-97.8 °F (36.6 °C)] 97.6 °F (36.4 °C)  Heart Rate:  [70-99] 79  Resp:  [16-18] 18  BP: (100-153)/(59-99) 121/68  SpO2:  [96 %-99 %] 96 %  on   ;   Device (Oxygen Therapy): room air    Body mass index is 29.42 kg/m².  Wt Readings from Last 3 Encounters:   09/08/19 93 kg (205 lb 0.4 oz)       PHYSICAL EXAM:  Constitutional: No acute distress  HEENT: Normocephalic atraumatic  Neck: Supple  Cardiovascular: Regular rate and rhythm  Pulmonary/Chest: Clear to auscultation  Abdominal:  .  Positive bowel sounds soft  Musculoskeletal: No arthropathy  Neurological: No focal deficits  Skin: No rashes  Peripheral vascular: Palpable pedal pulses  Genitourinary::    DISCHARGE DISPOSITION   Stable    DISCHARGE MEDICATIONS:     Discharge Medications      New Medications      Instructions Start Date   aspirin 81 MG EC tablet   81 mg, Oral, Daily   Start Date:  9/10/2019     atorvastatin 20 MG tablet  Commonly known as:  LIPITOR   20 mg, Oral, Daily      nicotine 14 MG/24HR patch  Commonly known as:  NICODERM CQ   1 patch, Transdermal, Every 24 Hours      nitroglycerin 0.4 MG SL tablet  Commonly known as:  NITROSTAT   One sl q5min prin for chest pain.Take no more than 3 doses in 15 minutes.              Your medication list      START taking these medications      Instructions Last Dose Given Next Dose Due   aspirin 81 MG EC tablet  Start taking on:  9/10/2019      Take 1 tablet by mouth Daily for 30 days.       atorvastatin 20 MG tablet  Commonly known as:  LIPITOR      Take 1 tablet by mouth Daily for 30 days.       nicotine 14 MG/24HR patch  Commonly known as:  NICODERM CQ      Place 1 patch on the skin  as directed by provider Daily for 14 days.       nitroglycerin 0.4 MG SL tablet  Commonly known as:  NITROSTAT      One sl q5min prin for chest pain.Take no more than 3 doses in 15 minutes.             Where to Get Your Medications      These medications were sent to [facility] - [place], [place] - [address] - [phone] PH - [phone] FX  [address], [place] [address]    Phone:  [phone]   · aspirin 81 MG EC tablet  · atorvastatin 20 MG tablet  · nicotine 14 MG/24HR patch  · nitroglycerin 0.4 MG SL tablet           No future appointments.  Additional Instructions for the Follow-ups that You Need to Schedule     Discharge Follow-up with Specified Provider: Dr Landry----followup of chest pain; 3 Weeks   As directed      To:  Dr Landry----followup of chest pain    Follow Up:  3 Weeks           Follow-up Information     Provider, No Known .    Contact information:  [facility] [address] [phone]                    TEST  RESULTS PENDING AT DISCHARGE       CODE STATUS  Code Status and Medical Interventions:   Ordered at: 09/08/19 2028     Level Of Support Discussed With:    Patient     Code Status:    CPR     Medical Interventions (Level of Support Prior to Arrest):    Full       Manfred Og MD  [facility] Hospitalist  09/09/19  3:50 PM